# Patient Record
Sex: MALE | Race: WHITE | NOT HISPANIC OR LATINO | Employment: OTHER | URBAN - NONMETROPOLITAN AREA
[De-identification: names, ages, dates, MRNs, and addresses within clinical notes are randomized per-mention and may not be internally consistent; named-entity substitution may affect disease eponyms.]

---

## 2024-11-07 ENCOUNTER — OFFICE VISIT (OUTPATIENT)
Dept: FAMILY MEDICINE CLINIC | Facility: CLINIC | Age: 24
End: 2024-11-07
Payer: MEDICAID

## 2024-11-07 VITALS
HEIGHT: 74 IN | DIASTOLIC BLOOD PRESSURE: 87 MMHG | SYSTOLIC BLOOD PRESSURE: 143 MMHG | BODY MASS INDEX: 40.32 KG/M2 | WEIGHT: 314.2 LBS | OXYGEN SATURATION: 98 % | RESPIRATION RATE: 20 BRPM | HEART RATE: 68 BPM | TEMPERATURE: 98.7 F

## 2024-11-07 DIAGNOSIS — Z13.0 SCREENING FOR ENDOCRINE, METABOLIC AND IMMUNITY DISORDER: ICD-10-CM

## 2024-11-07 DIAGNOSIS — E66.01 CLASS 3 SEVERE OBESITY WITH BODY MASS INDEX (BMI) OF 40.0 TO 44.9 IN ADULT, UNSPECIFIED OBESITY TYPE, UNSPECIFIED WHETHER SERIOUS COMORBIDITY PRESENT: ICD-10-CM

## 2024-11-07 DIAGNOSIS — Z13.228 SCREENING FOR ENDOCRINE, METABOLIC AND IMMUNITY DISORDER: ICD-10-CM

## 2024-11-07 DIAGNOSIS — Z13.29 SCREENING FOR ENDOCRINE, METABOLIC AND IMMUNITY DISORDER: ICD-10-CM

## 2024-11-07 DIAGNOSIS — G89.29 CHRONIC MIDLINE LOW BACK PAIN WITH RIGHT-SIDED SCIATICA: ICD-10-CM

## 2024-11-07 DIAGNOSIS — Z13.1 SCREENING FOR DIABETES MELLITUS: ICD-10-CM

## 2024-11-07 DIAGNOSIS — M54.41 CHRONIC MIDLINE LOW BACK PAIN WITH RIGHT-SIDED SCIATICA: ICD-10-CM

## 2024-11-07 DIAGNOSIS — M25.551 RIGHT HIP PAIN: ICD-10-CM

## 2024-11-07 DIAGNOSIS — E66.813 CLASS 3 SEVERE OBESITY WITH BODY MASS INDEX (BMI) OF 40.0 TO 44.9 IN ADULT, UNSPECIFIED OBESITY TYPE, UNSPECIFIED WHETHER SERIOUS COMORBIDITY PRESENT: ICD-10-CM

## 2024-11-07 DIAGNOSIS — Z11.59 ENCOUNTER FOR HEPATITIS C SCREENING TEST FOR LOW RISK PATIENT: ICD-10-CM

## 2024-11-07 DIAGNOSIS — I10 PRIMARY HYPERTENSION: Primary | ICD-10-CM

## 2024-11-07 DIAGNOSIS — Z13.220 SCREENING FOR LIPID DISORDERS: ICD-10-CM

## 2024-11-07 RX ORDER — HYDROCHLOROTHIAZIDE 25 MG/1
25 TABLET ORAL EVERY MORNING
Qty: 90 TABLET | Refills: 0 | Status: SHIPPED | OUTPATIENT
Start: 2024-11-07

## 2024-11-07 RX ORDER — HYDROCHLOROTHIAZIDE 25 MG/1
25 TABLET ORAL EVERY MORNING
COMMUNITY
Start: 2024-10-17 | End: 2024-11-07 | Stop reason: SDUPTHER

## 2024-11-07 RX ORDER — METOPROLOL SUCCINATE 50 MG/1
50 TABLET, EXTENDED RELEASE ORAL NIGHTLY
Qty: 90 TABLET | Refills: 0 | Status: SHIPPED | OUTPATIENT
Start: 2024-11-07

## 2024-11-07 RX ORDER — IBUPROFEN 800 MG/1
800 TABLET, FILM COATED ORAL EVERY 8 HOURS PRN
Qty: 30 TABLET | Refills: 0 | Status: SHIPPED | OUTPATIENT
Start: 2024-11-07

## 2024-11-07 RX ORDER — CYCLOBENZAPRINE HCL 10 MG
10 TABLET ORAL NIGHTLY
Qty: 30 TABLET | Refills: 1 | Status: SHIPPED | OUTPATIENT
Start: 2024-11-07

## 2024-11-07 RX ORDER — METOPROLOL SUCCINATE 25 MG/1
25 TABLET, EXTENDED RELEASE ORAL
COMMUNITY
Start: 2024-10-17 | End: 2024-11-07 | Stop reason: DRUGHIGH

## 2025-04-11 ENCOUNTER — CLINICAL SUPPORT (OUTPATIENT)
Dept: FAMILY MEDICINE CLINIC | Facility: CLINIC | Age: 25
End: 2025-04-11
Payer: MEDICAID

## 2025-04-11 DIAGNOSIS — E66.01 CLASS 3 SEVERE OBESITY WITH BODY MASS INDEX (BMI) OF 40.0 TO 44.9 IN ADULT, UNSPECIFIED OBESITY TYPE, UNSPECIFIED WHETHER SERIOUS COMORBIDITY PRESENT: ICD-10-CM

## 2025-04-11 DIAGNOSIS — E66.813 CLASS 3 SEVERE OBESITY WITH BODY MASS INDEX (BMI) OF 40.0 TO 44.9 IN ADULT, UNSPECIFIED OBESITY TYPE, UNSPECIFIED WHETHER SERIOUS COMORBIDITY PRESENT: ICD-10-CM

## 2025-04-11 DIAGNOSIS — I10 PRIMARY HYPERTENSION: ICD-10-CM

## 2025-04-11 DIAGNOSIS — Z13.228 SCREENING FOR ENDOCRINE, METABOLIC AND IMMUNITY DISORDER: ICD-10-CM

## 2025-04-11 DIAGNOSIS — Z13.0 SCREENING FOR ENDOCRINE, METABOLIC AND IMMUNITY DISORDER: ICD-10-CM

## 2025-04-11 DIAGNOSIS — Z13.29 SCREENING FOR ENDOCRINE, METABOLIC AND IMMUNITY DISORDER: ICD-10-CM

## 2025-04-11 DIAGNOSIS — Z11.59 ENCOUNTER FOR HEPATITIS C SCREENING TEST FOR LOW RISK PATIENT: ICD-10-CM

## 2025-04-11 DIAGNOSIS — Z13.220 SCREENING FOR LIPID DISORDERS: Primary | ICD-10-CM

## 2025-04-11 DIAGNOSIS — Z13.1 SCREENING FOR DIABETES MELLITUS: ICD-10-CM

## 2025-04-11 LAB
BASOPHILS # BLD AUTO: 0.01 10*3/MM3 (ref 0–0.2)
BASOPHILS NFR BLD AUTO: 0.2 % (ref 0–1.5)
DEPRECATED RDW RBC AUTO: 44.5 FL (ref 37–54)
EOSINOPHIL # BLD AUTO: 0.07 10*3/MM3 (ref 0–0.4)
EOSINOPHIL NFR BLD AUTO: 1.5 % (ref 0.3–6.2)
ERYTHROCYTE [DISTWIDTH] IN BLOOD BY AUTOMATED COUNT: 13.1 % (ref 12.3–15.4)
HCT VFR BLD AUTO: 45.6 % (ref 37.5–51)
HGB BLD-MCNC: 15 G/DL (ref 13–17.7)
IMM GRANULOCYTES # BLD AUTO: 0.01 10*3/MM3 (ref 0–0.05)
IMM GRANULOCYTES NFR BLD AUTO: 0.2 % (ref 0–0.5)
LYMPHOCYTES # BLD AUTO: 1.02 10*3/MM3 (ref 0.7–3.1)
LYMPHOCYTES NFR BLD AUTO: 21.3 % (ref 19.6–45.3)
MCH RBC QN AUTO: 30.5 PG (ref 26.6–33)
MCHC RBC AUTO-ENTMCNC: 32.9 G/DL (ref 31.5–35.7)
MCV RBC AUTO: 92.7 FL (ref 79–97)
MONOCYTES # BLD AUTO: 0.63 10*3/MM3 (ref 0.1–0.9)
MONOCYTES NFR BLD AUTO: 13.1 % (ref 5–12)
NEUTROPHILS NFR BLD AUTO: 3.06 10*3/MM3 (ref 1.7–7)
NEUTROPHILS NFR BLD AUTO: 63.7 % (ref 42.7–76)
NRBC BLD AUTO-RTO: 0 /100 WBC (ref 0–0.2)
PLATELET # BLD AUTO: 240 10*3/MM3 (ref 140–450)
PMV BLD AUTO: 10.7 FL (ref 6–12)
RBC # BLD AUTO: 4.92 10*6/MM3 (ref 4.14–5.8)
WBC NRBC COR # BLD AUTO: 4.8 10*3/MM3 (ref 3.4–10.8)

## 2025-04-11 PROCEDURE — 80061 LIPID PANEL: CPT

## 2025-04-11 PROCEDURE — 83036 HEMOGLOBIN GLYCOSYLATED A1C: CPT

## 2025-04-11 PROCEDURE — 86803 HEPATITIS C AB TEST: CPT

## 2025-04-11 PROCEDURE — 80050 GENERAL HEALTH PANEL: CPT

## 2025-04-11 NOTE — PROGRESS NOTES
Venipuncture Blood Specimen Collection  Venipuncture performed in LT ARM by Enrique Oconnor with good hemostasis. Patient tolerated the procedure well without complications.   04/11/25   Enrqiue Oconnor

## 2025-04-12 LAB
ALBUMIN SERPL-MCNC: 4.5 G/DL (ref 3.5–5.2)
ALBUMIN/GLOB SERPL: 1.5 G/DL
ALP SERPL-CCNC: 68 U/L (ref 39–117)
ALT SERPL W P-5'-P-CCNC: 33 U/L (ref 1–41)
ANION GAP SERPL CALCULATED.3IONS-SCNC: 11 MMOL/L (ref 5–15)
AST SERPL-CCNC: 24 U/L (ref 1–40)
BILIRUB SERPL-MCNC: 1.8 MG/DL (ref 0–1.2)
BUN SERPL-MCNC: 15 MG/DL (ref 6–20)
BUN/CREAT SERPL: 13.8 (ref 7–25)
CALCIUM SPEC-SCNC: 9.6 MG/DL (ref 8.6–10.5)
CHLORIDE SERPL-SCNC: 101 MMOL/L (ref 98–107)
CHOLEST SERPL-MCNC: 271 MG/DL (ref 0–200)
CO2 SERPL-SCNC: 26 MMOL/L (ref 22–29)
CREAT SERPL-MCNC: 1.09 MG/DL (ref 0.76–1.27)
EGFRCR SERPLBLD CKD-EPI 2021: 96 ML/MIN/1.73
GLOBULIN UR ELPH-MCNC: 3.1 GM/DL
GLUCOSE SERPL-MCNC: 83 MG/DL (ref 65–99)
HBA1C MFR BLD: 5 % (ref 4.8–5.6)
HCV AB SER QL: NORMAL
HDLC SERPL-MCNC: 38 MG/DL (ref 40–60)
LDLC SERPL CALC-MCNC: 223 MG/DL (ref 0–100)
LDLC/HDLC SERPL: 5.79 {RATIO}
POTASSIUM SERPL-SCNC: 3.9 MMOL/L (ref 3.5–5.2)
PROT SERPL-MCNC: 7.6 G/DL (ref 6–8.5)
SODIUM SERPL-SCNC: 138 MMOL/L (ref 136–145)
TRIGL SERPL-MCNC: 65 MG/DL (ref 0–150)
TSH SERPL DL<=0.05 MIU/L-ACNC: 1.43 UIU/ML (ref 0.27–4.2)
VLDLC SERPL-MCNC: 10 MG/DL (ref 5–40)

## 2025-04-18 ENCOUNTER — OFFICE VISIT (OUTPATIENT)
Dept: FAMILY MEDICINE CLINIC | Facility: CLINIC | Age: 25
End: 2025-04-18
Payer: MEDICAID

## 2025-04-18 VITALS
WEIGHT: 287 LBS | HEART RATE: 67 BPM | SYSTOLIC BLOOD PRESSURE: 132 MMHG | HEIGHT: 74 IN | DIASTOLIC BLOOD PRESSURE: 65 MMHG | RESPIRATION RATE: 14 BRPM | BODY MASS INDEX: 36.83 KG/M2 | TEMPERATURE: 98.6 F | OXYGEN SATURATION: 99 %

## 2025-04-18 DIAGNOSIS — E78.5 HYPERLIPIDEMIA, UNSPECIFIED HYPERLIPIDEMIA TYPE: ICD-10-CM

## 2025-04-18 DIAGNOSIS — I10 PRIMARY HYPERTENSION: Primary | ICD-10-CM

## 2025-04-18 DIAGNOSIS — E66.813 CLASS 3 SEVERE OBESITY WITH BODY MASS INDEX (BMI) OF 40.0 TO 44.9 IN ADULT, UNSPECIFIED OBESITY TYPE, UNSPECIFIED WHETHER SERIOUS COMORBIDITY PRESENT: ICD-10-CM

## 2025-04-18 DIAGNOSIS — E66.01 CLASS 3 SEVERE OBESITY WITH BODY MASS INDEX (BMI) OF 40.0 TO 44.9 IN ADULT, UNSPECIFIED OBESITY TYPE, UNSPECIFIED WHETHER SERIOUS COMORBIDITY PRESENT: ICD-10-CM

## 2025-04-18 PROCEDURE — T1015 CLINIC SERVICE: HCPCS

## 2025-04-18 PROCEDURE — 99214 OFFICE O/P EST MOD 30 MIN: CPT

## 2025-04-18 PROCEDURE — 1126F AMNT PAIN NOTED NONE PRSNT: CPT

## 2025-04-18 PROCEDURE — 1159F MED LIST DOCD IN RCRD: CPT

## 2025-04-18 PROCEDURE — 1160F RVW MEDS BY RX/DR IN RCRD: CPT

## 2025-04-18 RX ORDER — ROSUVASTATIN CALCIUM 10 MG/1
10 TABLET, COATED ORAL DAILY
Qty: 90 TABLET | Refills: 0 | Status: SHIPPED | OUTPATIENT
Start: 2025-04-18

## 2025-04-18 RX ORDER — ROSUVASTATIN CALCIUM 5 MG/1
5 TABLET, COATED ORAL DAILY
Qty: 90 TABLET | Refills: 0 | Status: SHIPPED | OUTPATIENT
Start: 2025-04-18 | End: 2025-04-18

## 2025-04-18 NOTE — PROGRESS NOTES
"Chief Complaint  Hypertension (5 month f/u on labs. Pt denies any complaints or concerns)    Subjective    History of Present Illness {CC  Problem List  Visit  Diagnosis   Encounters  Notes  Medications  Labs  Result Review Imaging  Media :23}     Evgeny Becerra presents to [facility] for Hypertension (5 month f/u on labs. Pt denies any complaints or concerns).      History of Present Illness     24-year-old male presents to follow-up on HTN and lab results.    BP Readings from Last 3 Encounters:   04/18/25 132/65   11/07/24 143/87     Patient is getting 125/80s at home. He reports no SE of the medication.  He is on metoprolol and hydrochlorothiazide now.    Main finding on lab results was hyperlipidemia.  Total cholesterol = 271, LDL cholesterol = 223, HDL cholesterol = 38, triglycerides = 65    Family history: Dad has DM, HTN, HLD, CAD, previous MI and CVA. Mom has HTN, HLD, CAD, previous MI and CABG.           Objective     Vital Signs:   /65   Pulse 67   Temp 98.6 °F (37 °C) (Temporal)   Resp 14   Ht 188 cm (74\")   Wt 130 kg (287 lb)   SpO2 99%   BMI 36.85 kg/m²   Current Outpatient Medications on File Prior to Visit   Medication Sig Dispense Refill    cyclobenzaprine (FLEXERIL) 10 MG tablet Take 1 tablet by mouth Every Night. 30 tablet 1    hydroCHLOROthiazide 25 MG tablet Take 1 tablet by mouth Every Morning. 90 tablet 0    ibuprofen (ADVIL,MOTRIN) 800 MG tablet Take 1 tablet by mouth Every 8 (Eight) Hours As Needed for Moderate Pain. 30 tablet 0    metoprolol succinate XL (Toprol XL) 50 MG 24 hr tablet Take 1 tablet by mouth Every Night. 90 tablet 0     No current facility-administered medications on file prior to visit.        Past Medical History:   Diagnosis Date    Arthritis     Hypertension       History reviewed. No pertinent surgical history.   Family History   Problem Relation Age of Onset    Diabetes Mother     Hypertension Mother     Heart " disease Mother     Stroke Father     Hearing loss Father     Arthritis Father     Heart disease Father     Hypertension Father     Heart attack Father       Social History     Socioeconomic History    Marital status: Single   Tobacco Use    Smoking status: Never    Smokeless tobacco: Never   Vaping Use    Vaping status: Never Used   Substance and Sexual Activity    Alcohol use: Never    Drug use: Never    Sexual activity: Not Currently     Partners: Female         Clinical Support on 04/11/2025   Component Date Value Ref Range Status    Hepatitis C Ab 04/11/2025 Non-Reactive  Non-Reactive Final         Physical Exam  Constitutional:       Appearance: Normal appearance.   HENT:      Head: Normocephalic and atraumatic.   Eyes:      General: No scleral icterus.     Extraocular Movements: Extraocular movements intact.   Cardiovascular:      Rate and Rhythm: Normal rate and regular rhythm.      Pulses: Normal pulses.      Heart sounds: Normal heart sounds. No murmur heard.     No friction rub. No gallop.   Pulmonary:      Effort: Pulmonary effort is normal.      Breath sounds: Normal breath sounds. No wheezing, rhonchi or rales.   Abdominal:      General: Abdomen is flat. Bowel sounds are normal.      Palpations: Abdomen is soft.      Tenderness: There is no abdominal tenderness. There is no right CVA tenderness or left CVA tenderness.   Musculoskeletal:      Cervical back: Neck supple.   Skin:     General: Skin is warm and dry.   Neurological:      Mental Status: He is alert. Mental status is at baseline.      Coordination: Coordination normal.      Gait: Gait normal.   Psychiatric:         Mood and Affect: Mood normal.         Behavior: Behavior normal.         Thought Content: Thought content normal.         Judgment: Judgment normal.          Result Review  Data Reviewed:{ Labs  Result Review  Imaging  Med Tab  Media :23}   I have reviewed this patient's chart.  I have reviewed previous labs, previous imaging,  previous medications, and previous encounters with notes that were available in this patient's chart.               Assessment and Plan {CC Problem List  Visit Diagnosis  ROS  Review (Popup)  Health Maintenance  Quality  BestPractice  Medications  SmartSets  SnapShot Encounters  Media :23}      Primary hypertension           Hyperlipidemia, unspecified hyperlipidemia type   Will start statin given LDL is     Orders:    rosuvastatin (Crestor) 10 MG tablet; Take 1 tablet by mouth Daily.    Class 3 severe obesity with body mass index (BMI) of 40.0 to 44.9 in adult, unspecified obesity type, unspecified whether serious comorbidity present                -For HTN continue metoprolol and hydrochlorothiazide.  Continue monitoring at home.  Follow-up in 3 months.    -For hyperlipidemia I do recommend that he start a statin given significance of LDL and total cholesterol, HTN, patient's mother and father have cardiovascular disease with patient's father having his first MI at age 25.  -ER red flags discussed with patient including risk versus benefit and education provided.  -Follow-up with me      Follow Up {Instructions Charge Capture  Follow-up Communications :23}     Patient was given instructions and counseling regarding his condition or for health maintenance advice. Please see specific information pulled into the AVS (placed there by myself) if appropriate.    Return in about 3 months (around 7/18/2025) for follow up obesity and HLD.      Mary Jo Christianson PA-C

## 2025-05-02 DIAGNOSIS — I10 PRIMARY HYPERTENSION: ICD-10-CM

## 2025-05-02 RX ORDER — HYDROCHLOROTHIAZIDE 25 MG/1
25 TABLET ORAL EVERY MORNING
Qty: 90 TABLET | Refills: 0 | Status: SHIPPED | OUTPATIENT
Start: 2025-05-02

## 2025-07-13 DIAGNOSIS — E78.5 HYPERLIPIDEMIA, UNSPECIFIED HYPERLIPIDEMIA TYPE: ICD-10-CM

## 2025-07-14 RX ORDER — ROSUVASTATIN CALCIUM 10 MG/1
10 TABLET, COATED ORAL DAILY
Qty: 90 TABLET | Refills: 0 | Status: SHIPPED | OUTPATIENT
Start: 2025-07-14 | End: 2025-07-18

## 2025-07-18 ENCOUNTER — OFFICE VISIT (OUTPATIENT)
Dept: FAMILY MEDICINE CLINIC | Facility: CLINIC | Age: 25
End: 2025-07-18
Payer: MEDICAID

## 2025-07-18 VITALS
DIASTOLIC BLOOD PRESSURE: 85 MMHG | TEMPERATURE: 98.9 F | BODY MASS INDEX: 38.5 KG/M2 | HEART RATE: 80 BPM | SYSTOLIC BLOOD PRESSURE: 119 MMHG | OXYGEN SATURATION: 97 % | RESPIRATION RATE: 14 BRPM | WEIGHT: 300 LBS | HEIGHT: 74 IN

## 2025-07-18 DIAGNOSIS — G89.29 CHRONIC MIDLINE LOW BACK PAIN WITH RIGHT-SIDED SCIATICA: ICD-10-CM

## 2025-07-18 DIAGNOSIS — I10 PRIMARY HYPERTENSION: ICD-10-CM

## 2025-07-18 DIAGNOSIS — M54.41 CHRONIC MIDLINE LOW BACK PAIN WITH RIGHT-SIDED SCIATICA: ICD-10-CM

## 2025-07-18 DIAGNOSIS — E78.5 HYPERLIPIDEMIA, UNSPECIFIED HYPERLIPIDEMIA TYPE: Primary | ICD-10-CM
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
## 2025-07-18 DIAGNOSIS — M79.10 MYALGIA: ICD-10-CM

## 2025-07-18 RX ORDER — ATORVASTATIN CALCIUM 10 MG/1
5 TABLET, FILM COATED ORAL DAILY
Qty: 45 TABLET | Refills: 0 | Status: SHIPPED | OUTPATIENT
Start: 2025-07-18

## 2025-07-18 RX ORDER — CYCLOBENZAPRINE HCL 10 MG
10 TABLET ORAL NIGHTLY
Qty: 30 TABLET | Refills: 1 | Status: SHIPPED | OUTPATIENT
Start: 2025-07-18

## 2025-07-18 RX ORDER — METOPROLOL SUCCINATE 50 MG/1
50 TABLET, EXTENDED RELEASE ORAL NIGHTLY
Qty: 90 TABLET | Refills: 1 | Status: SHIPPED | OUTPATIENT
Start: 2025-07-18

## 2025-07-18 RX ORDER — IBUPROFEN 800 MG/1
800 TABLET, FILM COATED ORAL EVERY 8 HOURS PRN
Qty: 30 TABLET | Refills: 0 | Status: SHIPPED | OUTPATIENT
Start: 2025-07-18

## 2025-07-18 RX ORDER — HYDROCHLOROTHIAZIDE 25 MG/1
25 TABLET ORAL EVERY MORNING
Qty: 90 TABLET | Refills: 1 | Status: SHIPPED | OUTPATIENT
Start: 2025-07-18

## 2025-07-18 NOTE — PROGRESS NOTES
Chief Complaint  Hypertension (3 month f/u pt denies any new concerns. ) and Insect Bite (Pt was stung by a wasp on his rt middle finger)    Subjective    History of Present Illness {CC  Problem List  Visit  Diagnosis   Encounters  Notes  Medications  Labs  Result Review Imaging  Media :23}     Evgeny Becerra presents to [facility] for Hypertension (3 month f/u pt denies any new concerns. ) and Insect Bite (Pt was stung by a wasp on his rt middle finger).           History of Present Illness  The patient presents for a follow-up visit, having been last seen approximately 3 months ago. The primary reason for this visit is to monitor their hypertension and hyperlipidemia. They are currently on metoprolol and hydrochlorothiazide for hypertension and were prescribed a statin for hyperlipidemia during the last visit. Their LDL was previously recorded at 223 and total cholesterol at 271. Their parents have a history of cardiovascular disease, with their father experiencing a myocardial infarction at age 25.    They report adherence to their prescribed regimen of hydrochlorothiazide and metoprolol, taking one in the morning and the other at night. They have lost 13 pounds since the last visit, attributing the weight loss to physical labor involving shoveling dirt, rock, and wood.    They attempted to take rosuvastatin once but discontinued it due to adverse reactions, including nausea and acid reflux. This was their first experience with statins.    They occasionally use ibuprofen and Flexeril as needed. After prolonged exposure to the sun, they experience muscle cramps in their legs and back, which they attribute to sweating. To manage this, they consume an electrolyte drink called Element, which they find beneficial.    Recently, they were stung by an insect on their finger, which caused significant swelling and pain. The bite has not completely healed and continues to cause  "discomfort. They report a known allergy to insect stings.    FAMILY HISTORY  The patient's mother and father had cardiovascular disease. The patient's father had his first myocardial infarction at age 25.        Objective     Vital Signs:   /85   Pulse 80   Temp 98.9 °F (37.2 °C) (Temporal)   Resp 14   Ht 188 cm (74\")   Wt 136 kg (300 lb)   SpO2 97%   BMI 38.52 kg/m²   Current Outpatient Medications on File Prior to Visit   Medication Sig Dispense Refill    [DISCONTINUED] cyclobenzaprine (FLEXERIL) 10 MG tablet Take 1 tablet by mouth Every Night. 30 tablet 1    [DISCONTINUED] ibuprofen (ADVIL,MOTRIN) 800 MG tablet Take 1 tablet by mouth Every 8 (Eight) Hours As Needed for Moderate Pain. 30 tablet 0    [DISCONTINUED] metoprolol succinate XL (Toprol XL) 50 MG 24 hr tablet Take 1 tablet by mouth Every Night. 90 tablet 0    [DISCONTINUED] rosuvastatin (CRESTOR) 10 MG tablet TAKE 1 TABLET BY MOUTH EVERY DAY 90 tablet 0    [DISCONTINUED] hydroCHLOROthiazide 25 MG tablet TAKE 1 TABLET BY MOUTH EVERY DAY IN THE MORNING (Patient not taking: Reported on 7/18/2025) 90 tablet 0     No current facility-administered medications on file prior to visit.        Past Medical History:   Diagnosis Date    Arthritis     Hyperlipidemia     Hypertension       History reviewed. No pertinent surgical history.   Family History   Problem Relation Age of Onset    Diabetes Mother     Hypertension Mother     Heart disease Mother     Stroke Father     Hearing loss Father     Arthritis Father     Heart disease Father     Hypertension Father     Heart attack Father       Social History     Socioeconomic History    Marital status: Single   Tobacco Use    Smoking status: Never    Smokeless tobacco: Never   Vaping Use    Vaping status: Never Used   Substance and Sexual Activity    Alcohol use: Never    Drug use: Never    Sexual activity: Not Currently     Partners: Female         No visits with results within 3 Month(s) from this visit. "   Latest known visit with results is:   Clinical Support on 04/11/2025   Component Date Value Ref Range Status    Hepatitis C Ab 04/11/2025 Non-Reactive  Non-Reactive Final         Physical Exam  Vitals and nursing note reviewed.   Constitutional:       Appearance: Normal appearance.   HENT:      Head: Normocephalic and atraumatic.   Eyes:      General: No scleral icterus.     Extraocular Movements: Extraocular movements intact.   Cardiovascular:      Rate and Rhythm: Normal rate and regular rhythm.      Pulses: Normal pulses.      Heart sounds: Normal heart sounds. No murmur heard.     No friction rub. No gallop.   Pulmonary:      Effort: Pulmonary effort is normal.      Breath sounds: Normal breath sounds. No wheezing, rhonchi or rales.   Abdominal:      General: Abdomen is flat. Bowel sounds are normal.      Palpations: Abdomen is soft.      Tenderness: There is no abdominal tenderness. There is no right CVA tenderness or left CVA tenderness.   Musculoskeletal:      Cervical back: Neck supple.   Skin:     General: Skin is warm and dry.      Comments: Right middle finger patient says there was an insect bite but nothing present now, no lesions, no rash, no erythema, FROM IPJs, brisk capillary refill   Neurological:      Mental Status: He is alert. Mental status is at baseline.   Psychiatric:         Mood and Affect: Mood normal.         Behavior: Behavior normal.         Thought Content: Thought content normal.         Judgment: Judgment normal.          Result Review  Data Reviewed:{ Labs  Result Review  Imaging  Med Tab  Media :23}   I have reviewed this patient's chart.  I have reviewed previous labs, previous imaging, previous medications, and previous encounters with notes that were available in this patient's chart.               Assessment and Plan {CC Problem List  Visit Diagnosis  ROS  Review (Popup)  [facility] Maintenance  Quality  BestPractice  Medications  SmartSets  SnapShot Encounters   Media :23}      Hyperlipidemia, unspecified hyperlipidemia type       Orders:    atorvastatin (LIPITOR) 10 MG tablet; Take 0.5 tablets by mouth Daily.    Primary hypertension      Orders:    hydroCHLOROthiazide 25 MG tablet; Take 1 tablet by mouth Every Morning.    metoprolol succinate XL (Toprol XL) 50 MG 24 hr tablet; Take 1 tablet by mouth Every Night.    Chronic midline low back pain with right-sided sciatica    Orders:    ibuprofen (ADVIL,MOTRIN) 800 MG tablet; Take 1 tablet by mouth Every 8 (Eight) Hours As Needed for Moderate Pain.    cyclobenzaprine (FLEXERIL) 10 MG tablet; Take 1 tablet by mouth Every Night.    Myalgia    Orders:    ibuprofen (ADVIL,MOTRIN) 800 MG tablet; Take 1 tablet by mouth Every 8 (Eight) Hours As Needed for Moderate Pain.    cyclobenzaprine (FLEXERIL) 10 MG tablet; Take 1 tablet by mouth Every Night.    Insect bite, unspecified site, initial encounter              Assessment & Plan  1. Hypertension:  - Blood pressure is well-controlled at 119/85 mmHg.  - Weight loss of 13 pounds since the last visit.  - Current regimen of metoprolol and hydrochlorothiazide will be continued.    2. Hyperlipidemia:  - Experienced adverse effects with rosuvastatin, including acid reflux.  - Trial of atorvastatin 10 mg, starting with half a tablet (5 mg) for 1 to 2 weeks, will be initiated.  - Dosage will be gradually increased if tolerated.        -ER red flags discussed with patient including risk versus benefit and education provided.  -Follow-up with me      Follow Up {Instructions Charge Capture  Follow-up Communications :23}     Patient was given instructions and counseling regarding his condition or for health maintenance advice. Please see specific information pulled into the AVS (placed there by myself) if appropriate.    Return in about 6 months (around 1/18/2026) for Follow up on chronic conditions/ongoing concerns.      Mary Jo Christianson PA-C      Patient or patient representative  verbalized consent for the use of Ambient Listening during the visit with  Mary Jo Christianson PA-C for chart documentation. 7/18/2025  13:51 EDT